# Patient Record
Sex: MALE | Race: WHITE | NOT HISPANIC OR LATINO
[De-identification: names, ages, dates, MRNs, and addresses within clinical notes are randomized per-mention and may not be internally consistent; named-entity substitution may affect disease eponyms.]

---

## 2019-05-01 ENCOUNTER — APPOINTMENT (OUTPATIENT)
Dept: INTERNAL MEDICINE | Facility: CLINIC | Age: 54
End: 2019-05-01
Payer: COMMERCIAL

## 2019-05-01 VITALS
DIASTOLIC BLOOD PRESSURE: 80 MMHG | SYSTOLIC BLOOD PRESSURE: 123 MMHG | WEIGHT: 176 LBS | BODY MASS INDEX: 25.2 KG/M2 | HEART RATE: 58 BPM | HEIGHT: 70 IN

## 2019-05-01 DIAGNOSIS — R10.13 EPIGASTRIC PAIN: ICD-10-CM

## 2019-05-01 DIAGNOSIS — Z82.3 FAMILY HISTORY OF STROKE: ICD-10-CM

## 2019-05-01 DIAGNOSIS — Z78.9 OTHER SPECIFIED HEALTH STATUS: ICD-10-CM

## 2019-05-01 DIAGNOSIS — F07.0 PERSONALITY CHANGE DUE TO KNOWN PHYSIOLOGICAL CONDITION: ICD-10-CM

## 2019-05-01 DIAGNOSIS — Z80.42 FAMILY HISTORY OF MALIGNANT NEOPLASM OF PROSTATE: ICD-10-CM

## 2019-05-01 DIAGNOSIS — Z00.00 ENCOUNTER FOR GENERAL ADULT MEDICAL EXAMINATION W/OUT ABNORMAL FINDINGS: ICD-10-CM

## 2019-05-01 DIAGNOSIS — Z82.0 FAMILY HISTORY OF EPILEPSY AND OTHER DISEASES OF THE NERVOUS SYSTEM: ICD-10-CM

## 2019-05-01 DIAGNOSIS — B00.1 HERPESVIRAL VESICULAR DERMATITIS: ICD-10-CM

## 2019-05-01 DIAGNOSIS — M47.812 SPONDYLOSIS W/OUT MYELOPATHY OR RADICULOPATHY, CERVICAL REGION: ICD-10-CM

## 2019-05-01 DIAGNOSIS — Z91.89 OTHER SPECIFIED PERSONAL RISK FACTORS, NOT ELSEWHERE CLASSIFIED: ICD-10-CM

## 2019-05-01 DIAGNOSIS — E78.2 MIXED HYPERLIPIDEMIA: ICD-10-CM

## 2019-05-01 PROCEDURE — 82270 OCCULT BLOOD FECES: CPT

## 2019-05-01 PROCEDURE — 36415 COLL VENOUS BLD VENIPUNCTURE: CPT

## 2019-05-01 PROCEDURE — 99204 OFFICE O/P NEW MOD 45 MIN: CPT | Mod: 25

## 2019-05-04 PROBLEM — F07.0 NEUROBEHAVIORAL DISORDER: Status: RESOLVED | Noted: 2019-05-04 | Resolved: 2019-05-04

## 2019-05-04 PROBLEM — E78.2 MIXED HYPERLIPIDEMIA: Status: ACTIVE | Noted: 2019-05-04

## 2019-05-04 PROBLEM — Z78.9 DRINKS BEER: Status: ACTIVE | Noted: 2019-05-04

## 2019-05-04 PROBLEM — Z82.0 FAMILY HISTORY OF ALZHEIMER'S DISEASE: Status: ACTIVE | Noted: 2019-05-04

## 2019-05-04 PROBLEM — Z91.89 CARDIOVASCULAR RISK FACTOR: Status: RESOLVED | Noted: 2019-05-04 | Resolved: 2019-05-04

## 2019-05-04 PROBLEM — Z82.3 FAMILY HISTORY OF CVA: Status: ACTIVE | Noted: 2019-05-04

## 2019-05-04 PROBLEM — B00.1 HERPES LABIALIS WITHOUT COMPLICATION: Status: ACTIVE | Noted: 2019-05-01

## 2019-05-04 PROBLEM — Z78.9 CAFFEINE USE: Status: ACTIVE | Noted: 2019-05-04

## 2019-05-04 PROBLEM — M47.812 FACET ARTHRITIS, DEGENERATIVE, CERVICAL SPINE: Status: ACTIVE | Noted: 2019-05-01

## 2019-05-04 PROBLEM — R10.13 DYSPEPSIA: Status: RESOLVED | Noted: 2019-05-04 | Resolved: 2019-05-04

## 2019-05-04 PROBLEM — Z80.42 FAMILY HISTORY OF PROSTATE CANCER: Status: ACTIVE | Noted: 2019-05-04

## 2019-05-04 LAB
ALBUMIN SERPL ELPH-MCNC: 4.6 G/DL
ALP BLD-CCNC: 49 U/L
ALT SERPL-CCNC: 18 U/L
ANION GAP SERPL CALC-SCNC: 14 MMOL/L
AST SERPL-CCNC: 27 U/L
BASOPHILS # BLD AUTO: 0.04 K/UL
BASOPHILS NFR BLD AUTO: 0.8 %
BILIRUB SERPL-MCNC: 0.7 MG/DL
BUN SERPL-MCNC: 13 MG/DL
CALCIUM SERPL-MCNC: 9.4 MG/DL
CHLORIDE SERPL-SCNC: 101 MMOL/L
CHOLEST SERPL-MCNC: 275 MG/DL
CHOLEST/HDLC SERPL: 5.5 RATIO
CO2 SERPL-SCNC: 26 MMOL/L
CREAT SERPL-MCNC: 0.95 MG/DL
CRP SERPL-MCNC: <0.1 MG/DL
EOSINOPHIL # BLD AUTO: 0.17 K/UL
EOSINOPHIL NFR BLD AUTO: 3.2 %
ERYTHROCYTE [SEDIMENTATION RATE] IN BLOOD BY WESTERGREN METHOD: 6 MM/HR
ESTIMATED AVERAGE GLUCOSE: 105 MG/DL
GLUCOSE SERPL-MCNC: 109 MG/DL
HBA1C MFR BLD HPLC: 5.3 %
HCT VFR BLD CALC: 42.6 %
HCV AB SER QL: NONREACTIVE
HCV S/CO RATIO: 0.09 S/CO
HDLC SERPL-MCNC: 50 MG/DL
HGB BLD-MCNC: 14.1 G/DL
IMM GRANULOCYTES NFR BLD AUTO: 0.4 %
LDLC SERPL CALC-MCNC: 204 MG/DL
LYMPHOCYTES # BLD AUTO: 1.93 K/UL
LYMPHOCYTES NFR BLD AUTO: 36.8 %
MAN DIFF?: NORMAL
MCHC RBC-ENTMCNC: 29.7 PG
MCHC RBC-ENTMCNC: 33.1 GM/DL
MCV RBC AUTO: 89.7 FL
MONOCYTES # BLD AUTO: 0.63 K/UL
MONOCYTES NFR BLD AUTO: 12 %
NEUTROPHILS # BLD AUTO: 2.45 K/UL
NEUTROPHILS NFR BLD AUTO: 46.8 %
PLATELET # BLD AUTO: 183 K/UL
POTASSIUM SERPL-SCNC: 4.2 MMOL/L
PROT SERPL-MCNC: 7 G/DL
PSA SERPL-MCNC: 1.13 NG/ML
RBC # BLD: 4.75 M/UL
RBC # FLD: 11.9 %
SODIUM SERPL-SCNC: 141 MMOL/L
TRIGL SERPL-MCNC: 106 MG/DL
URATE SERPL-MCNC: 6.6 MG/DL
WBC # FLD AUTO: 5.24 K/UL

## 2019-05-04 NOTE — PHYSICAL EXAM
[Well-Appearing] : well-appearing [Normal Sclera/Conjunctiva] : normal sclera/conjunctiva [Normal Oropharynx] : the oropharynx was normal [Normal Outer Ear/Nose] : the outer ears and nose were normal in appearance [Normal TMs] : both tympanic membranes were normal [Normal Rate] : normal rate  [No Respiratory Distress] : no respiratory distress  [Clear to Auscultation] : lungs were clear to auscultation bilaterally [Regular Rhythm] : with a regular rhythm [Normal S1, S2] : normal S1 and S2 [No Murmur] : no murmur heard [No Carotid Bruits] : no carotid bruits [No Varicosities] : no varicosities [No Edema] : there was no peripheral edema [Non Tender] : non-tender [Non-distended] : non-distended [Soft] : abdomen soft [Normal Bowel Sounds] : normal bowel sounds [No HSM] : no HSM [No Masses] : no abdominal mass palpated [No CVA Tenderness] : no CVA  tenderness [No Hernias] : no hernias [Grossly Normal Strength/Tone] : grossly normal strength/tone [No Skin Lesions] : no skin lesions [Normal Mood] : the mood was normal [Normal Affect] : the affect was normal [No Focal Deficits] : no focal deficits [Kyphosis] : no kyphosis [Stool Occult Blood] : stool negative for occult blood [Scoliosis] : no scoliosis [FreeTextEntry1] : normal sphincter and prostate size, symmetry, smoothness and consistency [de-identified] : significant decrease in ROM [de-identified] : no adenopathy

## 2019-05-04 NOTE — HISTORY OF PRESENT ILLNESS
[FreeTextEntry1] : Initial Internal Medicine evaluation at [facility] [de-identified] : Generally very healthy; No acute medical events. Details of known past medical events outlined below

## 2019-06-28 ENCOUNTER — APPOINTMENT (OUTPATIENT)
Dept: GASTROENTEROLOGY | Facility: CLINIC | Age: 54
End: 2019-06-28
Payer: COMMERCIAL

## 2019-06-28 PROCEDURE — 45378 DIAGNOSTIC COLONOSCOPY: CPT | Mod: 33

## 2019-09-27 ENCOUNTER — RX RENEWAL (OUTPATIENT)
Age: 54
End: 2019-09-27

## 2020-03-16 RX ORDER — ACYCLOVIR 50 MG/G
5 CREAM TOPICAL
Qty: 1 | Refills: 11 | Status: ACTIVE | COMMUNITY
Start: 2019-05-04 | End: 1900-01-01

## 2021-07-04 ENCOUNTER — TRANSCRIPTION ENCOUNTER (OUTPATIENT)
Age: 56
End: 2021-07-04

## 2022-02-03 ENCOUNTER — APPOINTMENT (OUTPATIENT)
Dept: OTOLARYNGOLOGY | Facility: CLINIC | Age: 57
End: 2022-02-03
Payer: COMMERCIAL

## 2022-02-03 VITALS
TEMPERATURE: 96.8 F | HEIGHT: 70 IN | WEIGHT: 175 LBS | HEART RATE: 61 BPM | BODY MASS INDEX: 25.05 KG/M2 | SYSTOLIC BLOOD PRESSURE: 138 MMHG | DIASTOLIC BLOOD PRESSURE: 90 MMHG

## 2022-02-03 DIAGNOSIS — J32.0 CHRONIC MAXILLARY SINUSITIS: ICD-10-CM

## 2022-02-03 PROCEDURE — 99204 OFFICE O/P NEW MOD 45 MIN: CPT | Mod: 25

## 2022-02-03 PROCEDURE — 31231 NASAL ENDOSCOPY DX: CPT

## 2022-02-03 NOTE — HISTORY OF PRESENT ILLNESS
[de-identified] : 56M here for initial evaluation.\par \par For the past few days, pt c/o left sided cheek pain, foul postnasal drip, nasal odor. This first started with left upper tooth pain and has progressively worsened; he thinks he has a sinus infection. He has never had these sx before, though ~7mo ago he had similar upper tooth pain which resolved on its own. These current sx persist despite various OTC meds including antihistamines, decongestants, tylenol and sx are exquisitely worse when he lays on his left side.\par \par ROS otherwise unremarkable.

## 2022-02-03 NOTE — PROCEDURE
[FreeTextEntry3] : Nasal Endoscopy:\par right septal deviation/spurring\par +white mucoid drainage from left middle meatus draining down choana --> cx taken\par no polyps, no masses\par

## 2022-02-03 NOTE — ASSESSMENT
[FreeTextEntry1] : 56M here for initial evaluation. For the past few days, pt c/o left sided cheek pain, foul postnasal drip and nasal odor. This first started with left upper tooth pain and has progressively worsened; he thinks he has a sinus infection. He has never had these sx before, though ~7mo ago he had similar upper tooth pain which resolved on its own. These current sx persist despite various OTC meds including antihistamines, decongestants, tylenol and sx are exquisitely worse when he lays on his left side. On exam, nasal endoscopy shows right septal deviation/spurring and white mucoid drainage from the left middle meatus draining down the choana. The rest of the head and neck exam is unremarkable.\par He has an acute left maxillary (at least) sinusitis. Will start augmentin and f/u cx in interim. He is flying tomorrow, so will also give medrol taper and use afrin prior to flight to minimize potential barosinus pain. He will let me know should sx persist/worsen and if so RTO then. Otherwise, as long as improves, RTO as needed.

## 2022-02-09 LAB — EAR NOSE AND THROAT CULTURE: ABNORMAL

## 2022-03-31 DIAGNOSIS — Z29.8 ENCOUNTER FOR OTHER SPECIFIED PROPHYLACTIC MEASURES: ICD-10-CM

## 2022-05-27 ENCOUNTER — NON-APPOINTMENT (OUTPATIENT)
Age: 57
End: 2022-05-27

## 2022-06-20 ENCOUNTER — NON-APPOINTMENT (OUTPATIENT)
Age: 57
End: 2022-06-20

## 2023-04-05 PROBLEM — Z00.00 WELL ADULT HEALTH CHECK: Status: ACTIVE | Noted: 2019-05-01

## 2023-10-11 ENCOUNTER — NON-APPOINTMENT (OUTPATIENT)
Age: 58
End: 2023-10-11

## 2023-11-06 ENCOUNTER — NON-APPOINTMENT (OUTPATIENT)
Age: 58
End: 2023-11-06

## 2023-11-06 ENCOUNTER — APPOINTMENT (OUTPATIENT)
Dept: INTERNAL MEDICINE | Facility: CLINIC | Age: 58
End: 2023-11-06
Payer: COMMERCIAL

## 2023-11-06 VITALS
OXYGEN SATURATION: 100 % | WEIGHT: 169.13 LBS | HEIGHT: 70.5 IN | DIASTOLIC BLOOD PRESSURE: 77 MMHG | BODY MASS INDEX: 23.94 KG/M2 | HEART RATE: 51 BPM | SYSTOLIC BLOOD PRESSURE: 117 MMHG | TEMPERATURE: 97.2 F

## 2023-11-06 DIAGNOSIS — K21.9 GASTRO-ESOPHAGEAL REFLUX DISEASE W/OUT ESOPHAGITIS: ICD-10-CM

## 2023-11-06 DIAGNOSIS — J30.9 ALLERGIC RHINITIS, UNSPECIFIED: ICD-10-CM

## 2023-11-06 PROCEDURE — 99205 OFFICE O/P NEW HI 60 MIN: CPT | Mod: 25

## 2023-11-06 PROCEDURE — 93000 ELECTROCARDIOGRAM COMPLETE: CPT | Mod: 59

## 2023-11-06 PROCEDURE — 36415 COLL VENOUS BLD VENIPUNCTURE: CPT

## 2023-11-06 RX ORDER — METHYLPREDNISOLONE 4 MG/1
4 TABLET ORAL
Qty: 1 | Refills: 0 | Status: DISCONTINUED | COMMUNITY
Start: 2022-02-03 | End: 2023-11-06

## 2023-11-06 RX ORDER — PANTOPRAZOLE 40 MG/1
40 TABLET, DELAYED RELEASE ORAL DAILY
Qty: 60 | Refills: 0 | Status: ACTIVE | COMMUNITY
Start: 2023-11-06 | End: 1900-01-01

## 2023-11-06 RX ORDER — VALACYCLOVIR 1 G/1
1 TABLET, FILM COATED ORAL
Qty: 30 | Refills: 11 | Status: DISCONTINUED | COMMUNITY
Start: 2019-05-04 | End: 2023-11-06

## 2023-11-06 RX ORDER — AMOXICILLIN AND CLAVULANATE POTASSIUM 875; 125 MG/1; MG/1
875-125 TABLET, COATED ORAL
Qty: 14 | Refills: 0 | Status: DISCONTINUED | COMMUNITY
Start: 2022-02-03 | End: 2023-11-06

## 2023-11-06 RX ORDER — ACETAZOLAMIDE 125 MG/1
125 TABLET ORAL TWICE DAILY
Qty: 20 | Refills: 0 | Status: DISCONTINUED | COMMUNITY
Start: 2022-03-31 | End: 2023-11-06

## 2023-11-12 ENCOUNTER — TRANSCRIPTION ENCOUNTER (OUTPATIENT)
Age: 58
End: 2023-11-12

## 2023-11-15 LAB
ALBUMIN SERPL ELPH-MCNC: 5 G/DL
ALP BLD-CCNC: 55 U/L
ALT SERPL-CCNC: 20 U/L
ANION GAP SERPL CALC-SCNC: 11 MMOL/L
AST SERPL-CCNC: 28 U/L
BILIRUB SERPL-MCNC: 0.6 MG/DL
BUN SERPL-MCNC: 16 MG/DL
CALCIUM SERPL-MCNC: 10.2 MG/DL
CHLORIDE SERPL-SCNC: 100 MMOL/L
CO2 SERPL-SCNC: 29 MMOL/L
CREAT SERPL-MCNC: 0.84 MG/DL
CRP SERPL-MCNC: <3 MG/L
EGFR: 100 ML/MIN/1.73M2
ERYTHROCYTE [SEDIMENTATION RATE] IN BLOOD BY WESTERGREN METHOD: 3 MM/HR
ESTIMATED AVERAGE GLUCOSE: 108 MG/DL
GLUCOSE SERPL-MCNC: 117 MG/DL
HBA1C MFR BLD HPLC: 5.4 %
HCT VFR BLD CALC: 45.8 %
HGB BLD-MCNC: 14.7 G/DL
MCHC RBC-ENTMCNC: 29.9 PG
MCHC RBC-ENTMCNC: 32.1 GM/DL
MCV RBC AUTO: 93.3 FL
PLATELET # BLD AUTO: 201 K/UL
POTASSIUM SERPL-SCNC: 5.5 MMOL/L
PROT SERPL-MCNC: 7.3 G/DL
PSA SERPL-MCNC: 1.83 NG/ML
RBC # BLD: 4.91 M/UL
RBC # FLD: 12.6 %
SODIUM SERPL-SCNC: 140 MMOL/L
WBC # FLD AUTO: 4.39 K/UL

## 2023-12-05 ENCOUNTER — NON-APPOINTMENT (OUTPATIENT)
Age: 58
End: 2023-12-05

## 2024-08-02 PROBLEM — Z91.89 CARDIOVASCULAR RISK FACTOR: Status: RESOLVED | Noted: 2019-05-04 | Resolved: 2024-08-02

## 2024-12-17 ENCOUNTER — APPOINTMENT (OUTPATIENT)
Dept: INTERNAL MEDICINE | Facility: CLINIC | Age: 59
End: 2024-12-17
Payer: COMMERCIAL

## 2024-12-17 VITALS
SYSTOLIC BLOOD PRESSURE: 120 MMHG | DIASTOLIC BLOOD PRESSURE: 71 MMHG | BODY MASS INDEX: 26.52 KG/M2 | WEIGHT: 175 LBS | OXYGEN SATURATION: 94 % | HEART RATE: 60 BPM | HEIGHT: 68 IN

## 2024-12-17 DIAGNOSIS — Z00.00 ENCOUNTER FOR GENERAL ADULT MEDICAL EXAMINATION W/OUT ABNORMAL FINDINGS: ICD-10-CM

## 2024-12-17 DIAGNOSIS — K21.9 GASTRO-ESOPHAGEAL REFLUX DISEASE W/OUT ESOPHAGITIS: ICD-10-CM

## 2024-12-17 DIAGNOSIS — E78.2 MIXED HYPERLIPIDEMIA: ICD-10-CM

## 2024-12-17 DIAGNOSIS — Z12.83 ENCOUNTER FOR SCREENING FOR MALIGNANT NEOPLASM OF SKIN: ICD-10-CM

## 2024-12-17 DIAGNOSIS — B00.1 HERPESVIRAL VESICULAR DERMATITIS: ICD-10-CM

## 2024-12-17 PROCEDURE — 36415 COLL VENOUS BLD VENIPUNCTURE: CPT

## 2024-12-17 PROCEDURE — 99214 OFFICE O/P EST MOD 30 MIN: CPT | Mod: 25

## 2024-12-17 PROCEDURE — 99396 PREV VISIT EST AGE 40-64: CPT

## 2024-12-17 PROCEDURE — G0444 DEPRESSION SCREEN ANNUAL: CPT | Mod: 59

## 2024-12-17 RX ORDER — VALACYCLOVIR 500 MG/1
500 TABLET, FILM COATED ORAL
Qty: 90 | Refills: 0 | Status: ACTIVE | COMMUNITY
Start: 1900-01-01 | End: 1900-01-01

## 2024-12-18 LAB
25(OH)D3 SERPL-MCNC: 44.7 NG/ML
BASOPHILS # BLD AUTO: 0.04 K/UL
BASOPHILS NFR BLD AUTO: 0.4 %
EOSINOPHIL # BLD AUTO: 0.05 K/UL
EOSINOPHIL NFR BLD AUTO: 0.6 %
ESTIMATED AVERAGE GLUCOSE: 111 MG/DL
HBA1C MFR BLD HPLC: 5.5 %
HCT VFR BLD CALC: 41.9 %
HGB BLD-MCNC: 13.6 G/DL
IMM GRANULOCYTES NFR BLD AUTO: 0.3 %
LYMPHOCYTES # BLD AUTO: 0.97 K/UL
LYMPHOCYTES NFR BLD AUTO: 10.9 %
MAN DIFF?: NORMAL
MCHC RBC-ENTMCNC: 30 PG
MCHC RBC-ENTMCNC: 32.5 G/DL
MCV RBC AUTO: 92.5 FL
MONOCYTES # BLD AUTO: 0.73 K/UL
MONOCYTES NFR BLD AUTO: 8.2 %
NEUTROPHILS # BLD AUTO: 7.11 K/UL
NEUTROPHILS NFR BLD AUTO: 79.6 %
PLATELET # BLD AUTO: 200 K/UL
RBC # BLD: 4.53 M/UL
RBC # FLD: 12.2 %
WBC # FLD AUTO: 8.93 K/UL

## 2024-12-19 LAB
ALBUMIN SERPL ELPH-MCNC: 4.5 G/DL
ALP BLD-CCNC: 46 U/L
ALT SERPL-CCNC: 17 U/L
ANION GAP SERPL CALC-SCNC: 15 MMOL/L
AST SERPL-CCNC: 28 U/L
BILIRUB SERPL-MCNC: 0.4 MG/DL
BUN SERPL-MCNC: 16 MG/DL
CALCIUM SERPL-MCNC: 9.8 MG/DL
CHLORIDE SERPL-SCNC: 101 MMOL/L
CHOLEST SERPL-MCNC: 281 MG/DL
CO2 SERPL-SCNC: 24 MMOL/L
CREAT SERPL-MCNC: 0.98 MG/DL
EGFR: 88 ML/MIN/1.73M2
GLUCOSE SERPL-MCNC: 80 MG/DL
HDLC SERPL-MCNC: 62 MG/DL
LDLC SERPL CALC-MCNC: 200 MG/DL
NONHDLC SERPL-MCNC: 220 MG/DL
POTASSIUM SERPL-SCNC: 4.8 MMOL/L
PROT SERPL-MCNC: 6.9 G/DL
PSA SERPL-MCNC: 1.54 NG/ML
SODIUM SERPL-SCNC: 140 MMOL/L
TRIGL SERPL-MCNC: 112 MG/DL

## 2024-12-19 RX ORDER — ROSUVASTATIN CALCIUM 5 MG/1
5 TABLET, FILM COATED ORAL DAILY
Qty: 90 | Refills: 1 | Status: ACTIVE | COMMUNITY
Start: 2024-12-19 | End: 1900-01-01

## 2024-12-24 LAB — APO LP(A) SERPL-MCNC: <9 NMOL/L

## 2025-02-26 ENCOUNTER — NON-APPOINTMENT (OUTPATIENT)
Age: 60
End: 2025-02-26